# Patient Record
(demographics unavailable — no encounter records)

---

## 2025-02-21 NOTE — HISTORY OF PRESENT ILLNESS
[FreeTextEntry1] : New patient appointment for CV risk assessment.  66 yo woman, emigrated from [facility] > 30 yrs ago  HLD former smoker, at age 26 basal septal hypertrophy no known DM, HTN; no known FH 5 pregnancies, no complications  Echo 2023: Normal LV size, function Basal septal hypertrophy Gd 1 diastolic dysfunction Mild LAE Mild MR  MCOT 2023 (two weeks): [address] infrequent PVC's  Carotid U/S 2023: minimal plaque  EKG today; Sinus Rhythm  RSR'V1V2 with early repolarization OTHERWISE NORMAL LIMITS  no formal exercise no symptoms during daily activities healthy diet, for the most part weight in good control no chest pain, SOB/DELUNA, edema, palpitations, syncope

## 2025-02-21 NOTE — HISTORY OF PRESENT ILLNESS
[FreeTextEntry1] : New patient appointment for CV risk assessment.  68 yo woman, emigrated from [facility] > 30 yrs ago  HLD former smoker, at age 26 basal septal hypertrophy no known DM, HTN; no known FH 5 pregnancies, no complications  Echo 2023: Normal LV size, function Basal septal hypertrophy Gd 1 diastolic dysfunction Mild LAE Mild MR  MCOT 2023 (two weeks): [address] infrequent PVC's  Carotid U/S 2023: minimal plaque  EKG today; Sinus Rhythm  RSR'V1V2 with early repolarization OTHERWISE NORMAL LIMITS  no formal exercise no symptoms during daily activities healthy diet, for the most part weight in good control no chest pain, SOB/DELUNA, edema, palpitations, syncope

## 2025-02-21 NOTE — HISTORY OF PRESENT ILLNESS
[FreeTextEntry1] : New patient appointment for CV risk assessment.  66 yo woman, emigrated from [place] > 30 yrs ago  HLD former smoker, at age 26 basal septal hypertrophy no known DM, HTN; no known FH 5 pregnancies, no complications  Echo 2023: Normal LV size, function Basal septal hypertrophy Gd 1 diastolic dysfunction Mild LAE Mild MR  MCOT 2023 (two weeks): [address] infrequent PVC's  Carotid U/S 2023: minimal plaque  EKG today; Sinus Rhythm  RSR'V1V2 with early repolarization OTHERWISE NORMAL LIMITS  no formal exercise no symptoms during daily activities healthy diet, for the most part weight in good control no chest pain, SOB/DELUNA, edema, palpitations, syncope